# Patient Record
Sex: MALE | Race: OTHER | URBAN - METROPOLITAN AREA
[De-identification: names, ages, dates, MRNs, and addresses within clinical notes are randomized per-mention and may not be internally consistent; named-entity substitution may affect disease eponyms.]

---

## 2018-05-29 ENCOUNTER — EMERGENCY (EMERGENCY)
Facility: HOSPITAL | Age: 53
LOS: 1 days | Discharge: HOME | End: 2018-05-29
Attending: EMERGENCY MEDICINE
Payer: COMMERCIAL

## 2018-05-29 VITALS
HEART RATE: 93 BPM | DIASTOLIC BLOOD PRESSURE: 68 MMHG | TEMPERATURE: 98 F | SYSTOLIC BLOOD PRESSURE: 138 MMHG | RESPIRATION RATE: 18 BRPM | OXYGEN SATURATION: 100 %

## 2018-05-29 VITALS — WEIGHT: 179.9 LBS | HEIGHT: 66 IN

## 2018-05-29 PROCEDURE — 99283 EMERGENCY DEPT VISIT LOW MDM: CPT | Mod: 25

## 2018-05-29 PROCEDURE — 73552 X-RAY EXAM OF FEMUR 2/>: CPT | Mod: 26,LT

## 2018-05-29 PROCEDURE — 73552 X-RAY EXAM OF FEMUR 2/>: CPT

## 2018-05-29 PROCEDURE — 99283 EMERGENCY DEPT VISIT LOW MDM: CPT

## 2018-05-29 NOTE — ED ADULT TRIAGE NOTE - WEIGHT METHOD
pt presents via ems for evaluation of difficulty breathing, was at dialysis but was unable to access right chest wall shiley and developed worsening sob with rales, and hypertension. denies any additional symptoms. Given NTG 0.4mg SL x 1 with relief PMHX: esrd, htn, dm stated

## 2018-05-29 NOTE — ED PROVIDER NOTE - MUSCULOSKELETAL, MLM
Spine appears normal, range of motion is not limited, no muscle or joint tenderness Spine appears normal, range of motion is not limited, left anterior thigh tenderness.

## 2018-05-29 NOTE — ED PROCEDURE NOTE - NS ED PERI VASCULAR NEG
no paresthesia/fingers/toes warm to touch/no cyanosis of extremity/no swelling/capillary refill time < 2 seconds

## 2018-05-29 NOTE — ED PROVIDER NOTE - OBJECTIVE STATEMENT
51 y/o M with no significant PMHx presents to ED c/o L thigh pain x 3 days. Pt describes carrying a bag, stumbling and then falling because of pain in leg. Pt was unable to walk up stairs without falling yesterday or today, prompting ED visit today. Pt denies any bruising, deformities, or any other complaints. NKDA.

## 2019-07-02 ENCOUNTER — TRANSCRIPTION ENCOUNTER (OUTPATIENT)
Age: 54
End: 2019-07-02

## 2023-12-20 PROBLEM — Z00.00 ENCOUNTER FOR PREVENTIVE HEALTH EXAMINATION: Status: ACTIVE | Noted: 2023-12-20

## 2023-12-26 ENCOUNTER — APPOINTMENT (OUTPATIENT)
Dept: CT IMAGING | Facility: CLINIC | Age: 58
End: 2023-12-26
Payer: COMMERCIAL

## 2023-12-26 PROCEDURE — 75574 CT ANGIO HRT W/3D IMAGE: CPT
